# Patient Record
Sex: MALE | URBAN - METROPOLITAN AREA
[De-identification: names, ages, dates, MRNs, and addresses within clinical notes are randomized per-mention and may not be internally consistent; named-entity substitution may affect disease eponyms.]

---

## 2022-04-08 ENCOUNTER — OFFICE VISIT (OUTPATIENT)
Dept: URBAN - METROPOLITAN AREA CLINIC 56 | Facility: CLINIC | Age: 42
End: 2022-04-08
Payer: COMMERCIAL

## 2022-04-08 DIAGNOSIS — D31.62 BENIGN NEOPLASM OF LEFT ORBIT: ICD-10-CM

## 2022-04-08 DIAGNOSIS — D31.61 BENIGN NEOPLASM OF RIGHT ORBIT: ICD-10-CM

## 2022-04-08 DIAGNOSIS — E11.9 TYPE 2 DIABETES MELLITUS WITHOUT COMPLICATIONS: Primary | ICD-10-CM

## 2022-04-08 PROCEDURE — 92134 CPTRZ OPH DX IMG PST SGM RTA: CPT | Performed by: STUDENT IN AN ORGANIZED HEALTH CARE EDUCATION/TRAINING PROGRAM

## 2022-04-08 PROCEDURE — 92250 FUNDUS PHOTOGRAPHY W/I&R: CPT | Performed by: STUDENT IN AN ORGANIZED HEALTH CARE EDUCATION/TRAINING PROGRAM

## 2022-04-08 PROCEDURE — 92083 EXTENDED VISUAL FIELD XM: CPT | Performed by: STUDENT IN AN ORGANIZED HEALTH CARE EDUCATION/TRAINING PROGRAM

## 2022-04-08 PROCEDURE — 99204 OFFICE O/P NEW MOD 45 MIN: CPT | Performed by: STUDENT IN AN ORGANIZED HEALTH CARE EDUCATION/TRAINING PROGRAM

## 2022-04-08 ASSESSMENT — INTRAOCULAR PRESSURE
OD: 14
OS: 14

## 2022-04-08 ASSESSMENT — KERATOMETRY
OD: 43.38
OS: 43.30

## 2022-04-08 ASSESSMENT — VISUAL ACUITY
OS: 20/20
OD: 20/20

## 2022-04-08 NOTE — IMPRESSION/PLAN
Impression: Type 2 diabetes mellitus without complications: K78.7. Plan: continue strict BG control. F/u with PCP as directed. Call with vision changes.

## 2022-04-08 NOTE — IMPRESSION/PLAN
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 Plan: Pt had CT of temporal bones without contrast 02/24/22 ordered by ENT for hearing loss. CT results indicate multiple bilateral orbital masses, radiologist recommended MRI orbits with contrast.  

Only symptom is pain on eye movement OS. Good VA OU, no signs of ONH compression OU. Baseline HVF 30-2 full OU. Will consult with Dr. Rufina Hogue, but discussed given good vision and does not appear sight threatening, intervention is likely not indicated.

## 2022-08-08 ENCOUNTER — OFFICE VISIT (OUTPATIENT)
Dept: URBAN - METROPOLITAN AREA CLINIC 56 | Facility: CLINIC | Age: 42
End: 2022-08-08
Payer: COMMERCIAL

## 2022-08-08 DIAGNOSIS — D31.61 BENIGN NEOPLASM OF RIGHT ORBIT: Primary | ICD-10-CM

## 2022-08-08 DIAGNOSIS — D31.62 BENIGN NEOPLASM OF LEFT ORBIT: ICD-10-CM

## 2022-08-08 PROCEDURE — 99204 OFFICE O/P NEW MOD 45 MIN: CPT | Performed by: OPHTHALMOLOGY

## 2022-08-08 PROCEDURE — 92285 EXTERNAL OCULAR PHOTOGRAPHY: CPT | Performed by: OPHTHALMOLOGY

## 2022-08-08 NOTE — IMPRESSION/PLAN
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 Plan: Orbital pain OU. Left orbital pain worse with left gaze. present for approx 5 months. Denies hx of auto-immune disease, but unclear if he has had work up. Bilateral orbital masses in setting of new onset DMII and recent left nephrectomy for unclear etiology. MRI with semi-symmetric bilateral orbital masses/changes in orbital fat. No apd OU. no proptosis OU, mild resistance to retropulsion OU. EOM's full OU. DDx is broad at this point, but I suspect autoimmune disease. Given that VA is 20/20 with minimal proptosis OU; will hold off on biopsy until I can get medical records. RTC 2-4 weeks or sooner PRN.  will likely refer to Dr. Ibrahima Lee.

## 2022-08-10 ENCOUNTER — TESTING ONLY (OUTPATIENT)
Dept: URBAN - METROPOLITAN AREA CLINIC 56 | Facility: CLINIC | Age: 42
End: 2022-08-10
Payer: COMMERCIAL

## 2022-08-10 PROCEDURE — 92285 EXTERNAL OCULAR PHOTOGRAPHY: CPT | Performed by: OPHTHALMOLOGY

## 2022-08-10 PROCEDURE — 99204 OFFICE O/P NEW MOD 45 MIN: CPT | Performed by: OPHTHALMOLOGY

## 2022-08-22 ENCOUNTER — OFFICE VISIT (OUTPATIENT)
Dept: URBAN - METROPOLITAN AREA CLINIC 51 | Facility: CLINIC | Age: 42
End: 2022-08-22
Payer: COMMERCIAL

## 2022-08-22 DIAGNOSIS — D31.61 BENIGN NEOPLASM OF RIGHT ORBIT: Primary | ICD-10-CM

## 2022-08-22 PROCEDURE — 99213 OFFICE O/P EST LOW 20 MIN: CPT | Performed by: OPHTHALMOLOGY

## 2022-08-22 NOTE — IMPRESSION/PLAN
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 Plan: Orbital pain OU. Left orbital pain worse with left gaze. present for approx 5 months. Denies hx of auto-immune disease, but unclear if he has had work up. Bilateral orbital masses in setting of new onset DMII and recent left nephrectomy for unclear etiology. exam stable today. DDx is broad at this point, but I suspect autoimmune disease. Will schedule for orbital biopsy. RTC 2-4 weeks or sooner PRN.  will likely refer to Dr. Ankur Mejia.

## 2022-09-14 ENCOUNTER — OFFICE VISIT (OUTPATIENT)
Dept: URBAN - METROPOLITAN AREA CLINIC 34 | Facility: CLINIC | Age: 42
End: 2022-09-14
Payer: COMMERCIAL

## 2022-09-14 DIAGNOSIS — D31.61 BENIGN NEOPLASM OF RIGHT ORBIT: Primary | ICD-10-CM

## 2022-09-14 PROCEDURE — 99213 OFFICE O/P EST LOW 20 MIN: CPT | Performed by: OPHTHALMOLOGY

## 2022-09-14 NOTE — IMPRESSION/PLAN
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 Plan: Orbital pain OU. Left orbital pain worse with left gaze. present for approx 5 months. Denies hx of auto-immune disease, but unclear if he has had work up. Bilateral orbital masses in setting of new onset DMII and recent left nephrectomy for unclear etiology. LAbs from outside MD sent but no auto-immune work up noted. exam stable today. PAtient scheduled for orbitotomy to biopsy mass next week. Will refer to Dr. Hayward Kawasaki for auto-immune work up. All questions answered.

## 2022-09-27 ENCOUNTER — POST-OPERATIVE VISIT (OUTPATIENT)
Dept: URBAN - METROPOLITAN AREA CLINIC 51 | Facility: CLINIC | Age: 42
End: 2022-09-27
Payer: COMMERCIAL

## 2022-09-27 DIAGNOSIS — Z48.89 ENCOUNTER FOR OTHER SPECIFIED SURGICAL AFTERCARE: Primary | ICD-10-CM

## 2022-09-27 PROCEDURE — 99024 POSTOP FOLLOW-UP VISIT: CPT | Performed by: OPHTHALMOLOGY

## 2022-09-27 NOTE — IMPRESSION/PLAN
Impression: S/P Orbitotomy with biopsy of right orbit OD - 6 Days. Encounter for other specified surgical aftercare  Z48.89. Plan: healing well following right orbitotomy. pain much improved following surgery. EOM's full. VA stable. RTC 1 month or sooner PRN. Will arrange for referral to Dr. Dakotah Pace for auto-immune work up.

## 2022-10-25 ENCOUNTER — POST-OPERATIVE VISIT (OUTPATIENT)
Dept: URBAN - METROPOLITAN AREA CLINIC 51 | Facility: CLINIC | Age: 42
End: 2022-10-25
Payer: COMMERCIAL

## 2022-10-25 DIAGNOSIS — Z48.89 ENCOUNTER FOR OTHER SPECIFIED SURGICAL AFTERCARE: Primary | ICD-10-CM

## 2022-10-25 PROCEDURE — 99024 POSTOP FOLLOW-UP VISIT: CPT | Performed by: OPHTHALMOLOGY

## 2022-10-25 NOTE — IMPRESSION/PLAN
Impression:  Encounter for other specified surgical aftercare  Z48.89. Plan: doing well; feeling better following debulking surgery. No signs of infection. pending consult with Dr. Brendon Lundy. still with high c/f auto-immune disease. RTC with me in 1 month. CAse d/w DR. Walter- Will order labs via [employer]: CBC with diff, CMP, ESR, CRP, ANCA, and AVISE CTD. 

RTC in 1 month

## 2022-11-22 ENCOUNTER — POST-OPERATIVE VISIT (OUTPATIENT)
Dept: URBAN - METROPOLITAN AREA CLINIC 51 | Facility: CLINIC | Age: 42
End: 2022-11-22
Payer: COMMERCIAL

## 2022-11-22 DIAGNOSIS — Z48.89 ENCOUNTER FOR OTHER SPECIFIED SURGICAL AFTERCARE: Primary | ICD-10-CM

## 2022-11-22 PROCEDURE — 99024 POSTOP FOLLOW-UP VISIT: CPT | Performed by: OPHTHALMOLOGY

## 2022-11-22 NOTE — IMPRESSION/PLAN
Impression: S/P Orbitotomy with biopsy of right orbit OD - 62 Days. Encounter for other specified surgical aftercare  Z48.89. Plan: well healed following R orbitotomy to biopsy lesion. Initial work up with elevated ESR/CRP, pending additional testing with Dr. Hayward Kawasaki. Next appt with Hayward Kawasaki is 12/6/22. RTC with me in 2 months or sooner PRN.

## 2023-03-06 ENCOUNTER — POST-OPERATIVE VISIT (OUTPATIENT)
Dept: URBAN - METROPOLITAN AREA CLINIC 51 | Facility: CLINIC | Age: 43
End: 2023-03-06
Payer: COMMERCIAL

## 2023-03-06 DIAGNOSIS — Z48.89 ENCOUNTER FOR OTHER SPECIFIED SURGICAL AFTERCARE: Primary | ICD-10-CM

## 2023-03-06 PROCEDURE — 99024 POSTOP FOLLOW-UP VISIT: CPT | Performed by: OPHTHALMOLOGY

## 2023-03-06 NOTE — IMPRESSION/PLAN
Impression: S/P Orbitotomy with biopsy of right orbit OD - 62 Days. Encounter for other specified surgical aftercare  Z48.89. Plan: well healed following R orbitotomy to biopsy lesion. Initial work up with elevated ESR/CRP. Patient has f/u with rheum this week at [facility]. No changes in vision. proptosis with 2mm improvement OD following debulking/biopsy. Will contact [facility] pathology to see about second read. All questions answered. RTC 2-3 months or sooner PRN.

## 2023-05-03 ENCOUNTER — OFFICE VISIT (OUTPATIENT)
Dept: URBAN - METROPOLITAN AREA CLINIC 56 | Facility: LOCATION | Age: 43
End: 2023-05-03
Payer: COMMERCIAL

## 2023-05-03 DIAGNOSIS — E11.9 TYPE 2 DIABETES MELLITUS WITHOUT COMPLICATIONS: Primary | ICD-10-CM

## 2023-05-03 DIAGNOSIS — D31.62 BENIGN NEOPLASM OF LEFT ORBIT: ICD-10-CM

## 2023-05-03 DIAGNOSIS — H35.412 LATTICE DEGENERATION OF RETINA, LEFT EYE: ICD-10-CM

## 2023-05-03 DIAGNOSIS — D31.61 BENIGN NEOPLASM OF RIGHT ORBIT: ICD-10-CM

## 2023-05-03 PROCEDURE — 92134 CPTRZ OPH DX IMG PST SGM RTA: CPT | Performed by: STUDENT IN AN ORGANIZED HEALTH CARE EDUCATION/TRAINING PROGRAM

## 2023-05-03 PROCEDURE — 92014 COMPRE OPH EXAM EST PT 1/>: CPT | Performed by: STUDENT IN AN ORGANIZED HEALTH CARE EDUCATION/TRAINING PROGRAM

## 2023-05-03 ASSESSMENT — INTRAOCULAR PRESSURE
OD: 15
OS: 15

## 2023-05-03 ASSESSMENT — VISUAL ACUITY
OS: 20/20
OD: 20/20

## 2023-05-03 ASSESSMENT — KERATOMETRY
OS: 43.33
OD: 43.65

## 2023-05-03 NOTE — IMPRESSION/PLAN
Impression: Type 2 diabetes mellitus without complications: D57.1. Plan: continue strict BG control. F/u with PCP as directed. Call with vision changes.

## 2023-05-03 NOTE — IMPRESSION/PLAN
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 Plan: well healed following R orbitotomy to biopsy lesion. Initial work up with elevated ESR/CRP. Pt currently under the care of rheumatology - rheum has requested updated orbital imaging. Second radiology interpretation with [facility] in file - discuss results with Dr. Burton Live next week No changes in vision. proptosis with 2mm improvement OD following debulking/biopsy.  Order MRI brain and orbits w/ and w/out contrast. RTC as scheduled with Dr. Burton Live next week

## 2023-05-03 NOTE — IMPRESSION/PLAN
Impression: Lattice degeneration of retina, left eye: H35.412. Plan: no holes/tears/detachments. RD precautions discussed. Monitor.

## 2023-08-01 ENCOUNTER — OFFICE VISIT (OUTPATIENT)
Dept: URBAN - METROPOLITAN AREA CLINIC 51 | Facility: CLINIC | Age: 43
End: 2023-08-01
Payer: COMMERCIAL

## 2023-08-01 DIAGNOSIS — D76.3 XANTHOGRANULOMA: Primary | ICD-10-CM

## 2023-08-01 PROCEDURE — 99212 OFFICE O/P EST SF 10 MIN: CPT | Performed by: OPHTHALMOLOGY

## 2023-10-16 ENCOUNTER — OFFICE VISIT (OUTPATIENT)
Dept: URBAN - METROPOLITAN AREA CLINIC 51 | Facility: CLINIC | Age: 43
End: 2023-10-16
Payer: COMMERCIAL

## 2023-10-16 DIAGNOSIS — D76.3 XANTHOGRANULOMA: Primary | ICD-10-CM

## 2023-10-16 PROCEDURE — 99211 OFF/OP EST MAY X REQ PHY/QHP: CPT | Performed by: OPHTHALMOLOGY

## 2025-04-03 ENCOUNTER — OFFICE VISIT (OUTPATIENT)
Dept: URBAN - METROPOLITAN AREA CLINIC 56 | Facility: LOCATION | Age: 45
End: 2025-04-03
Payer: COMMERCIAL

## 2025-04-03 DIAGNOSIS — D31.62 BENIGN NEOPLASM OF LEFT ORBIT: ICD-10-CM

## 2025-04-03 DIAGNOSIS — D76.3 XANTHOGRANULOMA: ICD-10-CM

## 2025-04-03 DIAGNOSIS — H35.412 LATTICE DEGENERATION OF RETINA, LEFT EYE: ICD-10-CM

## 2025-04-03 DIAGNOSIS — E11.9 TYPE 2 DIABETES MELLITUS WITHOUT COMPLICATIONS: Primary | ICD-10-CM

## 2025-04-03 DIAGNOSIS — Z79.84 LONG TERM (CURRENT) USE OF ORAL ANTIDIABETIC DRUGS: ICD-10-CM

## 2025-04-03 PROCEDURE — 99214 OFFICE O/P EST MOD 30 MIN: CPT | Performed by: STUDENT IN AN ORGANIZED HEALTH CARE EDUCATION/TRAINING PROGRAM

## 2025-04-03 PROCEDURE — 92134 CPTRZ OPH DX IMG PST SGM RTA: CPT | Performed by: STUDENT IN AN ORGANIZED HEALTH CARE EDUCATION/TRAINING PROGRAM

## 2025-04-03 ASSESSMENT — VISUAL ACUITY
OD: 20/20
OS: 20/20

## 2025-04-03 ASSESSMENT — INTRAOCULAR PRESSURE
OS: 20
OD: 18

## 2025-04-03 ASSESSMENT — KERATOMETRY
OS: 42.99
OD: 43.13